# Patient Record
Sex: MALE | Race: OTHER | HISPANIC OR LATINO
[De-identification: names, ages, dates, MRNs, and addresses within clinical notes are randomized per-mention and may not be internally consistent; named-entity substitution may affect disease eponyms.]

---

## 2024-04-15 ENCOUNTER — APPOINTMENT (OUTPATIENT)
Dept: SURGERY | Facility: CLINIC | Age: 54
End: 2024-04-15
Payer: MEDICAID

## 2024-04-15 VITALS
HEART RATE: 115 BPM | SYSTOLIC BLOOD PRESSURE: 155 MMHG | HEIGHT: 70 IN | WEIGHT: 180 LBS | DIASTOLIC BLOOD PRESSURE: 84 MMHG | OXYGEN SATURATION: 95 % | BODY MASS INDEX: 25.77 KG/M2

## 2024-04-15 DIAGNOSIS — Z78.9 OTHER SPECIFIED HEALTH STATUS: ICD-10-CM

## 2024-04-15 DIAGNOSIS — Z82.49 FAMILY HISTORY OF ISCHEMIC HEART DISEASE AND OTHER DISEASES OF THE CIRCULATORY SYSTEM: ICD-10-CM

## 2024-04-15 DIAGNOSIS — Z80.9 FAMILY HISTORY OF MALIGNANT NEOPLASM, UNSPECIFIED: ICD-10-CM

## 2024-04-15 PROBLEM — Z00.00 ENCOUNTER FOR PREVENTIVE HEALTH EXAMINATION: Status: ACTIVE | Noted: 2024-04-15

## 2024-04-15 PROCEDURE — 99203 OFFICE O/P NEW LOW 30 MIN: CPT

## 2024-04-15 RX ORDER — LATANOPROST/PF 0.005 %
0.01 DROPS OPHTHALMIC (EYE)
Qty: 2 | Refills: 0 | Status: ACTIVE | COMMUNITY
Start: 2023-10-24

## 2024-04-15 NOTE — PHYSICAL EXAM
[Alert] : alert [Oriented to Person] : oriented to person [Oriented to Place] : oriented to place [Oriented to Time] : oriented to time [Calm] : calm [de-identified] : He  is alert, well-groomed, and in NAD   [de-identified] : anicteric.  Nasal mucosa pink, septum midline. Oral mucosa pink.  Tongue midline, Pharynx without exudates.   [de-identified] : Neck supple. Trachea midline. Thyroid isthmus barely palpable, lobes not felt.   [de-identified] : right leg mass   moves with the muscle , very Firm,  Smooth, non-tender,   Well defined. deep. No palpable lymph nodes.   Mass size -  4 cm x   4 cm.

## 2024-04-15 NOTE — PLAN
[FreeTextEntry1] : Mr. MATA  was told significance of findings, options, risks and benefits were explained.  All surgical options were discussed including non-surgical treatments.   he was advised to have a MRI of the right leg and return after the test.  Patient advised to seek immediate medical attention with any acute change in symptoms or with the development of any new or worsening symptoms.  Patient's questions and concerns addressed to patient's satisfaction, and patient verbalized an understanding of the information discussed.

## 2024-04-15 NOTE — CONSULT LETTER
[Dear  ___] : Dear  [unfilled], [Consult Letter:] : I had the pleasure of evaluating your patient, [unfilled]. [Please see my note below.] : Please see my note below. [Consult Closing:] : Thank you very much for allowing me to participate in the care of this patient.  If you have any questions, please do not hesitate to contact me. [Sincerely,] : Sincerely, [FreeTextEntry3] : Leandro Estrada MD, FACS

## 2024-04-15 NOTE — HISTORY OF PRESENT ILLNESS
[de-identified] : This is a 54 year  old patient who was referred by Dr. Remington Pereyra with the chief complaint of having  right leg mass.  He reports having this condition for 1 month. He denies any trauma to the area.   He denies any fever or  night sweats. Appetite is good and weight is stable.  He states that he is asymptomatic. He wants to know if it should be surgically  removed.

## 2024-04-19 ENCOUNTER — APPOINTMENT (OUTPATIENT)
Dept: MRI IMAGING | Facility: CLINIC | Age: 54
End: 2024-04-19
Payer: MEDICAID

## 2024-04-19 PROCEDURE — 73720 MRI LWR EXTREMITY W/O&W/DYE: CPT | Mod: RT

## 2024-04-19 PROCEDURE — A9585: CPT

## 2024-05-09 ENCOUNTER — APPOINTMENT (OUTPATIENT)
Dept: SURGERY | Facility: CLINIC | Age: 54
End: 2024-05-09

## 2024-05-14 ENCOUNTER — NON-APPOINTMENT (OUTPATIENT)
Age: 54
End: 2024-05-14

## 2024-05-30 ENCOUNTER — APPOINTMENT (OUTPATIENT)
Dept: SURGERY | Facility: CLINIC | Age: 54
End: 2024-05-30
Payer: MEDICAID

## 2024-05-30 VITALS
OXYGEN SATURATION: 96 % | DIASTOLIC BLOOD PRESSURE: 101 MMHG | HEIGHT: 70 IN | WEIGHT: 180 LBS | HEART RATE: 99 BPM | SYSTOLIC BLOOD PRESSURE: 157 MMHG | BODY MASS INDEX: 25.77 KG/M2

## 2024-05-30 DIAGNOSIS — M79.89 OTHER SPECIFIED SOFT TISSUE DISORDERS: ICD-10-CM

## 2024-05-30 PROCEDURE — 99213 OFFICE O/P EST LOW 20 MIN: CPT

## 2024-05-30 NOTE — DATA REVIEWED
[FreeTextEntry1] : 	 EXAM: [id number] - MR LWR EXT NON JOINT WAWIC RT  - ORDERED BY: PHILLY KRISHNAN  PROCEDURE DATE:  04/19/2024  INTERPRETATION:  INDICATION: Palpable mass above right knee  TECHNIQUE: Multiplanar, sequential images of the right thigh were obtained before and after administration of intravenous contrast, according to standard protocol.  Contrast: 8.2 cc of Gadavist administered Contrast discarded: 1.8 cc Oral contrast: None Complications: None  COMPARISON: None available  FINDINGS:  A skin marker indicating the area of palpable abnormality is placed approximately 13 cm proximal to the right knee joint. In this area, embedded within the distal vastus medialis muscle belly, there is a T1/T2 hyperintense lesion following fat signal on all sequences, compatible with an intramuscular lipoma. The lesion measures approximately 1.9 x 2.3 x 3.5 cm. There is no significant internal enhancement. There are a few internal thin septations without significant nodularity. No evidence for internal necrosis, significant mass effect or adjacent edema to suggest aggressive/malignant behavior. No additional lesion is identified.  The visualized musculature is otherwise normal in signal intensity and bulk. There are degenerative changes of the partially visualized knee.. The marrow signal is otherwise within normal limits. There is normal in course, signal and morphology. The visualized neurovascular structures.  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 No aggressive features aside from size measuring 3.0 cm.  --- End of Report ---       QUAN SIMMONS-NASEEM EVERETT; Attending Radiologist This document has been electronically signed. Apr 22 2024  2:21PM

## 2024-05-30 NOTE — PLAN
[FreeTextEntry1] : Mr. MATA  was told significance of findings, options, risks and benefits were explained.  Informed consent for excision right  anterior thigh mass          , and potential risks, benefits and alternatives (surgical options were discussed including non-surgical options or the option of no surgery) to the planned surgery were discussed in depth.  All surgical options were discussed including non-surgical treatments.  He wishes to proceed with surgery.  We will plan for surgery on at the next available date, pending any required insurance pre-certification or pre-approval. He agrees to obtain any necessary pre-operative evaluations and testing prior to surgery. Patient advised to seek immediate medical attention with any acute change in symptoms or with the development of any new or worsening symptoms.  Patient's questions and concerns addressed to patient's satisfaction, and patient verbalized an understanding of the information discussed.

## 2024-05-30 NOTE — HISTORY OF PRESENT ILLNESS
[de-identified] : This is a 54 year old patient who was referred by Dr. Remington Pereyra with the chief complaint of having right leg mass  for 1 month. Mr. 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 No aggressive features aside from size measuring 3.0 cm. today  he denies any symptoms.

## 2024-05-30 NOTE — PHYSICAL EXAM
[Alert] : alert [Oriented to Person] : oriented to person [Oriented to Place] : oriented to place [Oriented to Time] : oriented to time [Calm] : calm [de-identified] : He  is alert, well-groomed, and in NAD   [de-identified] : anicteric.  Nasal mucosa pink, septum midline. Oral mucosa pink.  Tongue midline, Pharynx without exudates.   [de-identified] : Neck supple. Trachea midline. Thyroid isthmus barely palpable, lobes not felt.   [de-identified] : right  anterior thigh mass   moves with the muscle , very Firm,  Smooth, non-tender,   Well defined. deep. No palpable lymph nodes.   Mass size -  4 cm x   4 cm.

## 2024-06-12 ENCOUNTER — OUTPATIENT (OUTPATIENT)
Dept: OUTPATIENT SERVICES | Facility: HOSPITAL | Age: 54
LOS: 1 days | End: 2024-06-12
Payer: MEDICAID

## 2024-06-12 VITALS
SYSTOLIC BLOOD PRESSURE: 150 MMHG | DIASTOLIC BLOOD PRESSURE: 89 MMHG | HEIGHT: 70 IN | TEMPERATURE: 99 F | OXYGEN SATURATION: 98 % | RESPIRATION RATE: 16 BRPM | WEIGHT: 179.02 LBS | HEART RATE: 78 BPM

## 2024-06-12 DIAGNOSIS — Z01.818 ENCOUNTER FOR OTHER PREPROCEDURAL EXAMINATION: ICD-10-CM

## 2024-06-12 DIAGNOSIS — Z98.890 OTHER SPECIFIED POSTPROCEDURAL STATES: Chronic | ICD-10-CM

## 2024-06-12 DIAGNOSIS — M79.89 OTHER SPECIFIED SOFT TISSUE DISORDERS: ICD-10-CM

## 2024-06-12 DIAGNOSIS — E55.9 VITAMIN D DEFICIENCY, UNSPECIFIED: ICD-10-CM

## 2024-06-12 DIAGNOSIS — Z11.52 ENCOUNTER FOR SCREENING FOR COVID-19: ICD-10-CM

## 2024-06-12 RX ORDER — SODIUM CHLORIDE 0.9 % (FLUSH) 0.9 %
3 SYRINGE (ML) INJECTION EVERY 8 HOURS
Refills: 0 | Status: DISCONTINUED | OUTPATIENT
Start: 2024-07-02 | End: 2024-07-16

## 2024-06-12 NOTE — H&P PST ADULT - ASSESSMENT
This is a 54 year old male with no significant PMHx presents to PST due to Right  thigh mass that has been there for months, who is now scheduled for Excision of right thigh mass on 6/19/2024.    Stop bang 2

## 2024-06-12 NOTE — H&P PST ADULT - HISTORY OF PRESENT ILLNESS
This is a 54 year old male with no significant PMHx presents to PST due to Right  thigh mass that has been there for months, who is now scheduled for Excision of right thigh mass on 6/19/2024.

## 2024-06-12 NOTE — H&P PST ADULT - NSICDXPROCEDURE_GEN_ALL_CORE_FT
PROCEDURES:  Excision of subfascial soft tissue tumor of thigh area; less than 5 cm 12-Jun-2024 09:15:57  Samara Pritchett

## 2024-06-12 NOTE — H&P PST ADULT - HEIGHT IN CM
Central Line Type: Port  Central Line Site: Chest  Port cleansed with ChloraPrep per protocol.  Accessed via: 20 gauge Stone needle  Patency Verification: Blood return greater or equal to 3 ml before, during and after treatment  Port flushed with: 20 ml 0.9 normal saline and 100 un/ml- 500 units heparin  Stone needle removed: Yes  Deaccess/site appearance: Clear (no redness, tenderness, or edema), dressing applied if required  Discharged: Stable  Comment:     Dr Zaragoza is supervising provider today.     177.8

## 2024-06-12 NOTE — H&P PST ADULT - PROBLEM SELECTOR PLAN 3
Denies covid symptoms, such as fever/chills/cough/ sore throat, abd pain, N/V/D, loss of taste/ smell, and headache.

## 2024-06-12 NOTE — H&P PST ADULT - NSANTHOSAYNRD_GEN_A_CORE
No. BETSY screening performed.  STOP BANG Legend: 0-2 = LOW Risk; 3-4 = INTERMEDIATE Risk; 5-8 = HIGH Risk

## 2024-06-12 NOTE — H&P PST ADULT - PROBLEM SELECTOR PLAN 1
scheduled for Excision of right thigh mass on 6/19/2024.      Preoperative instructions discussed and given to patient.  NPO after midnight the day before surgery.   Instructed to avoid aspirin and over the counter medications including vitamins and herbal medications one week before surgery.   Instructed to use chlorhexidine solution for three days, including the day of surgery  for pre-procedural skin preparation.   Copy of written instructions and chlorhexidine sheet was provided to patient.  Patient verbalized understanding.

## 2024-06-13 PROCEDURE — G0463: CPT

## 2024-07-01 ENCOUNTER — TRANSCRIPTION ENCOUNTER (OUTPATIENT)
Age: 54
End: 2024-07-01

## 2024-07-02 ENCOUNTER — TRANSCRIPTION ENCOUNTER (OUTPATIENT)
Age: 54
End: 2024-07-02

## 2024-07-02 ENCOUNTER — OUTPATIENT (OUTPATIENT)
Dept: OUTPATIENT SERVICES | Facility: HOSPITAL | Age: 54
LOS: 1 days | End: 2024-07-02
Payer: MEDICAID

## 2024-07-02 ENCOUNTER — APPOINTMENT (OUTPATIENT)
Dept: SURGERY | Facility: HOSPITAL | Age: 54
End: 2024-07-02

## 2024-07-02 ENCOUNTER — RESULT REVIEW (OUTPATIENT)
Age: 54
End: 2024-07-02

## 2024-07-02 VITALS
SYSTOLIC BLOOD PRESSURE: 167 MMHG | HEART RATE: 91 BPM | OXYGEN SATURATION: 95 % | TEMPERATURE: 98 F | RESPIRATION RATE: 16 BRPM | DIASTOLIC BLOOD PRESSURE: 95 MMHG

## 2024-07-02 VITALS
HEART RATE: 105 BPM | TEMPERATURE: 98 F | DIASTOLIC BLOOD PRESSURE: 100 MMHG | RESPIRATION RATE: 16 BRPM | HEIGHT: 70 IN | OXYGEN SATURATION: 99 % | SYSTOLIC BLOOD PRESSURE: 170 MMHG | WEIGHT: 179.02 LBS

## 2024-07-02 DIAGNOSIS — Z98.890 OTHER SPECIFIED POSTPROCEDURAL STATES: Chronic | ICD-10-CM

## 2024-07-02 DIAGNOSIS — M79.89 OTHER SPECIFIED SOFT TISSUE DISORDERS: ICD-10-CM

## 2024-07-02 PROCEDURE — ZZZZZ: CPT

## 2024-07-02 PROCEDURE — 88305 TISSUE EXAM BY PATHOLOGIST: CPT | Mod: 26

## 2024-07-02 PROCEDURE — 27328 EXC THIGH/KNEE TUM DEEP <5CM: CPT | Mod: RT

## 2024-07-02 PROCEDURE — 88305 TISSUE EXAM BY PATHOLOGIST: CPT

## 2024-07-02 RX ORDER — FENTANYL CITRATE 50 UG/ML
50 INJECTION, SOLUTION INTRAMUSCULAR; INTRAVENOUS
Refills: 0 | Status: DISCONTINUED | OUTPATIENT
Start: 2024-07-02 | End: 2024-07-02

## 2024-07-02 RX ORDER — ERGOCALCIFEROL 1.25 MG/1
1 CAPSULE ORAL
Refills: 0 | DISCHARGE

## 2024-07-02 RX ORDER — FENTANYL CITRATE 50 UG/ML
25 INJECTION, SOLUTION INTRAMUSCULAR; INTRAVENOUS
Refills: 0 | Status: DISCONTINUED | OUTPATIENT
Start: 2024-07-02 | End: 2024-07-02

## 2024-07-02 RX ORDER — OXYCODONE HYDROCHLORIDE 100 MG/5ML
1 SOLUTION ORAL
Qty: 6 | Refills: 0
Start: 2024-07-02

## 2024-07-02 RX ORDER — OXYCODONE HYDROCHLORIDE 100 MG/5ML
5 SOLUTION ORAL ONCE
Refills: 0 | Status: DISCONTINUED | OUTPATIENT
Start: 2024-07-02 | End: 2024-07-02

## 2024-07-03 PROBLEM — Z78.9 OTHER SPECIFIED HEALTH STATUS: Chronic | Status: ACTIVE | Noted: 2024-06-12

## 2024-07-09 LAB — SURGICAL PATHOLOGY STUDY: SIGNIFICANT CHANGE UP

## 2024-07-18 ENCOUNTER — APPOINTMENT (OUTPATIENT)
Dept: SURGERY | Facility: CLINIC | Age: 54
End: 2024-07-18
Payer: MEDICAID

## 2024-07-18 DIAGNOSIS — M79.89 OTHER SPECIFIED SOFT TISSUE DISORDERS: ICD-10-CM

## 2024-07-18 PROCEDURE — 99024 POSTOP FOLLOW-UP VISIT: CPT

## (undated) DEVICE — SUT POLYSORB 4-0 27" P-12 UNDYED

## (undated) DEVICE — WARMING BLANKET UPPER ADULT

## (undated) DEVICE — SUT POLYSORB 3-0 30" V-20

## (undated) DEVICE — VENODYNE/SCD SLEEVE CALF MEDIUM

## (undated) DEVICE — PACK MINOR NO DRAPE

## (undated) DEVICE — SUT POLYSORB 2-0 18" TIES UNDYED

## (undated) DEVICE — SOL IRR POUR NS 0.9% 1500ML

## (undated) DEVICE — DRAPE LIGHT HANDLE COVER (BLUE)

## (undated) DEVICE — DRAPE 1/2 SHEET 40X57"

## (undated) DEVICE — GLV 7 PROTEXIS (WHITE)

## (undated) DEVICE — DRAPE LAPAROTOMY W POUCHES

## (undated) DEVICE — DRSG CURITY GAUZE SPONGE 4 X 4" 12-PLY

## (undated) DEVICE — DRSG MASTISOL

## (undated) DEVICE — ELCTR GROUNDING PAD ADULT COVIDIEN

## (undated) DEVICE — NDL HYPO SAFE 25G X 1.5" (ORANGE)

## (undated) DEVICE — DRSG TEGADERM 4X4.75"

## (undated) DEVICE — GLV 7.5 PROTEXIS (WHITE)